# Patient Record
(demographics unavailable — no encounter records)

---

## 2024-12-31 NOTE — PHYSICAL EXAM
[de-identified] : Left hip exam  Skin: Clean/dry and intact Inspection: No obvious deformity, no swelling. Pulses: 2+ DP/PT pulses ROM: 100 flexion. Internal rotation to 40. External rotation to 40. Painful ROM: Pain with internal rotation, +groin pain.  Tenderness: No tenderness over greater trochanter. +tenderness at gluteal insertion. No paraspinal tenderness. No axial lumbar tenderness.No sacroiliac tenderness. No ttp over the ASIS/Illiac crest.  Tender to palpation of anterior hip Strength: 4/5 hip flexion, 5/5 Gluteal strength, 5/5 hip ADD, 5/5 hip ABD, 5/5 Q/H, 5/5 TA/GS/EHL Neuro: Sensation in tact to light touch throughout, DTR normal Additional tests: Negative impingement test, Negative ILEANA [de-identified] :  The following radiographs were ordered and read by me during this patients visit. I reviewed each radiograph in detail with the patient and discussed the findings as highlighted below.   3 views of the left hip were obtained today that show no acute fracture or dislocation. There is mild degenerative change seen. There is no gross malalignment.  Evidence of CAM lesion / NITIN.

## 2024-12-31 NOTE — DISCUSSION/SUMMARY
[de-identified] : 57-year-old female with left hip pain  Patient presents for evaluation of left hip pain.  Symptoms appear to be related to the anterior hip consistent with local hip flexor strain.  Patient also has some radiating symptoms through the lateral aspect of the hip consistent with local bursitis or iliotibial band irritation.  Symptoms particularly symptomatic with activity.  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   Recommendation: Activity modification, PT evaluation.  Symptom management with ice/NSAIDs as needed.  Follow-up as needed

## 2024-12-31 NOTE — DISCUSSION/SUMMARY
[de-identified] : 57-year-old female with left hip pain  Patient presents for evaluation of left hip pain.  Symptoms appear to be related to the anterior hip consistent with local hip flexor strain.  Patient also has some radiating symptoms through the lateral aspect of the hip consistent with local bursitis or iliotibial band irritation.  Symptoms particularly symptomatic with activity.  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   Recommendation: Activity modification, PT evaluation.  Symptom management with ice/NSAIDs as needed.  Follow-up as needed

## 2024-12-31 NOTE — PHYSICAL EXAM
[de-identified] : Left hip exam  Skin: Clean/dry and intact Inspection: No obvious deformity, no swelling. Pulses: 2+ DP/PT pulses ROM: 100 flexion. Internal rotation to 40. External rotation to 40. Painful ROM: Pain with internal rotation, +groin pain.  Tenderness: No tenderness over greater trochanter. +tenderness at gluteal insertion. No paraspinal tenderness. No axial lumbar tenderness.No sacroiliac tenderness. No ttp over the ASIS/Illiac crest.  Tender to palpation of anterior hip Strength: 4/5 hip flexion, 5/5 Gluteal strength, 5/5 hip ADD, 5/5 hip ABD, 5/5 Q/H, 5/5 TA/GS/EHL Neuro: Sensation in tact to light touch throughout, DTR normal Additional tests: Negative impingement test, Negative ILEANA [de-identified] :  The following radiographs were ordered and read by me during this patients visit. I reviewed each radiograph in detail with the patient and discussed the findings as highlighted below.   3 views of the left hip were obtained today that show no acute fracture or dislocation. There is mild degenerative change seen. There is no gross malalignment.  Evidence of CAM lesion / NITIN.

## 2025-01-09 NOTE — HISTORY OF PRESENT ILLNESS
[de-identified] : Ms. ABUNDIO ECHEVARRIA is a 57 year old female with Hx of anxiety, who presents for an annual physical.   Pt is following with ortho for left hip pain. She started PT this week. She c/o body aches and fatigue. Was in [place] and there were ticks. Would like to be checked for Lyme. Denies any SOB, CP, abdominal pain, N/V/D, headache, dizziness, or leg swelling.

## 2025-01-09 NOTE — ADDENDUM
[FreeTextEntry1] : Documented by Clair Greenwood acting as a scribe for Dr. Rajwinder Tadeo. 01/09/2025   All medical record entries made by the scribe were at my, Dr. Rajwinder Tadeo, direction and personally dictated by me on 01/09/2025. I have reviewed the chart and agree that the record accurately reflects my personal performance of the history, physical exam, assessment and plan. I have also personally directed, reviewed, and agreed with the chart.

## 2025-01-09 NOTE — HEALTH RISK ASSESSMENT
[Fair] :  ~his/her~ mood as fair [No] : No [Little interest or pleasure doing things] : 1) Little interest or pleasure doing things [0] : 1) Little interest or pleasure doing things: Not at all (0) [Feeling down, depressed, or hopeless] : 2) Feeling down, depressed, or hopeless [1] : 2) Feeling down, depressed, or hopeless for several days (1) [PHQ-2 Negative - No further assessment needed] : PHQ-2 Negative - No further assessment needed [Never] : Never [Patient reported mammogram was normal] : Patient reported mammogram was normal [Patient reported PAP Smear was normal] : Patient reported PAP Smear was normal [Patient reported colonoscopy was abnormal] : Patient reported colonoscopy was abnormal [With Family] : lives with family [# of Members in Household ___] :  household currently consist of [unfilled] member(s) [Employed] : employed [College] : College [] :  [# Of Children ___] : has [unfilled] children [Feels Safe at Home] : Feels safe at home [de-identified] : Maintains active by walking and biking. [de-identified] : Maintains a healthy diet.  [RPA4Dittx] : 0 [MammogramDate] : 04/2024 [PapSmearDate] : 01/2024 [ColonoscopyDate] : 05/2019 [ColonoscopyComments] : Internal hemorrhoids, one polyp removed. Was told to return in ten years.

## 2025-01-09 NOTE — PHYSICAL EXAM
[No Acute Distress] : no acute distress [Well Nourished] : well nourished [Well Developed] : well developed [Well-Appearing] : well-appearing [Normal Sclera/Conjunctiva] : normal sclera/conjunctiva [PERRL] : pupils equal round and reactive to light [EOMI] : extraocular movements intact [Normal Outer Ear/Nose] : the outer ears and nose were normal in appearance [Normal Oropharynx] : the oropharynx was normal [Normal TMs] : both tympanic membranes were normal [No JVD] : no jugular venous distention [No Lymphadenopathy] : no lymphadenopathy [Supple] : supple [Thyroid Normal, No Nodules] : the thyroid was normal and there were no nodules present [No Respiratory Distress] : no respiratory distress  [No Accessory Muscle Use] : no accessory muscle use [Clear to Auscultation] : lungs were clear to auscultation bilaterally [Normal Rate] : normal rate  [Regular Rhythm] : with a regular rhythm [Normal S1, S2] : normal S1 and S2 [No Murmur] : no murmur heard [Pedal Pulses Present] : the pedal pulses are present [No Edema] : there was no peripheral edema [No Extremity Clubbing/Cyanosis] : no extremity clubbing/cyanosis [Soft] : abdomen soft [Non Tender] : non-tender [Non-distended] : non-distended [No Masses] : no abdominal mass palpated [No HSM] : no HSM [Normal Bowel Sounds] : normal bowel sounds [Normal Posterior Cervical Nodes] : no posterior cervical lymphadenopathy [Normal Anterior Cervical Nodes] : no anterior cervical lymphadenopathy [No CVA Tenderness] : no CVA  tenderness [No Spinal Tenderness] : no spinal tenderness [No Joint Swelling] : no joint swelling [Grossly Normal Strength/Tone] : grossly normal strength/tone [No Rash] : no rash [Coordination Grossly Intact] : coordination grossly intact [No Focal Deficits] : no focal deficits [Normal Gait] : normal gait [Normal Affect] : the affect was normal [Normal Insight/Judgement] : insight and judgment were intact [de-identified] : + left hip pain with ROM

## 2025-01-09 NOTE — REVIEW OF SYSTEMS
[Fatigue] : fatigue [Joint Pain] : joint pain [Negative] : Heme/Lymph [FreeTextEntry9] : left hip pain

## 2025-01-09 NOTE — ASSESSMENT
[FreeTextEntry1] : Physical  UTD with colonoscopy and mammogram pt to schedule an sonny't with her GYN for PAP  Left hip pain following with ortho start PT  Body aches, fatigue we'll check lyme panel, arthralgia panel, TSH/T4 today  Anxiety cont Lexapro 5mg daily  Blood work ordered. Follow up in one week for lab results

## 2025-01-24 NOTE — ADDENDUM
[FreeTextEntry1] : Documented by Clair Greenwood acting as a scribe for Dr. Rajwinder Tadeo. 01/21/2025   All medical record entries made by the scribe were at my, Dr. Rajwinder Tadeo, direction and personally dictated by me on 01/21/2025. I have reviewed the chart and agree that the record accurately reflects my personal performance of the history, physical exam, assessment and plan. I have also personally directed, reviewed, and agreed with the chart.

## 2025-01-24 NOTE — HISTORY OF PRESENT ILLNESS
[de-identified] : Ms. ABUNDIO ECHEVARRIA is a 57 year old female with Hx of anxiety, who presents for follow up on lab results.   Pt c/o generalized joint pains.  Denies any SOB, CP, abdominal pain, N/V/D, headache, dizziness, or leg swelling.

## 2025-01-24 NOTE — HISTORY OF PRESENT ILLNESS
[de-identified] : Ms. ABUNDIO ECHEVARRIA is a 57 year old female with Hx of anxiety, who presents for follow up on lab results.   Pt c/o generalized joint pains.  Denies any SOB, CP, abdominal pain, N/V/D, headache, dizziness, or leg swelling.

## 2025-01-24 NOTE — ASSESSMENT
[FreeTextEntry1] : Follow up  borderline SBP today  Discussed the importance of following a low sodium diet  joint pains OTC Advil/Motrin prn follow up with rheumatology  left hip pain following with ortho start PT  Anxiety cont Lexapro 5mg daily  lab results reviewed and discussed with patient  microscopic WBC in urine, UA otherwise normal  slightly elevated cholesterol and LDL reinforced the importance of following a low cholesterol/low fat diet   sed rate slightly elevated, all other autoimmune markers negative lyme IGM negative, lyme IGG positive referred to ID- at pt's request referred to rheumatology

## 2025-04-01 NOTE — PHYSICAL EXAM
[de-identified] : Left hip exam  Skin: Clean/dry and intact Inspection: No obvious deformity, no swelling. Pulses: 2+ DP/PT pulses ROM: 100 flexion. Internal rotation to 40. External rotation to 40. Painful ROM: Pain with internal rotation, +groin pain.  Tenderness: No tenderness over greater trochanter. +tenderness at gluteal insertion. No paraspinal tenderness. No axial lumbar tenderness.No sacroiliac tenderness. No ttp over the ASIS/[place]iac crest.  Tender to palpation of anterior hip Strength: 4/5 hip flexion, 5/5 Gluteal strength, 5/5 hip ADD, 5/5 hip ABD, 5/5 Q/H, 5/5 TA/GS/EHL Neuro: Sensation in tact to light touch throughout, DTR normal Additional tests: Negative impingement test, Negative ILEANA [de-identified] : The following radiographs were ordered and read by me during this patients visit. I reviewed each radiograph in detail with the patient and discussed the findings as highlighted below.   3 views of the left hip were obtained today that show no acute fracture or dislocation. There is mild degenerative change seen. There is no gross malalignment.  Evidence of CAM lesion / NITIN.

## 2025-04-01 NOTE — HISTORY OF PRESENT ILLNESS
[de-identified] : 57-year-old female who presents for follow-up of left hip pain x 6 months. The pain is intermittent and without any specific cause or trauma.  Patient has been attending PT 2x per week without relief.  Her pain is worse if she walks or does any increased activity.  She reports ibuprofen provide some relief.  She feels limited since she enjoys hiking.  Patient also has difficulty with ADLs and work with stairs.

## 2025-04-01 NOTE — HISTORY OF PRESENT ILLNESS
[de-identified] : 57-year-old female who presents for follow-up of left hip pain x 6 months. The pain is intermittent and without any specific cause or trauma.  Patient has been attending PT 2x per week without relief.  Her pain is worse if she walks or does any increased activity.  She reports ibuprofen provide some relief.  She feels limited since she enjoys hiking.  Patient also has difficulty with ADLs and work with stairs.

## 2025-04-01 NOTE — PHYSICAL EXAM
[de-identified] : Left hip exam  Skin: Clean/dry and intact Inspection: No obvious deformity, no swelling. Pulses: 2+ DP/PT pulses ROM: 100 flexion. Internal rotation to 40. External rotation to 40. Painful ROM: Pain with internal rotation, +groin pain.  Tenderness: No tenderness over greater trochanter. +tenderness at gluteal insertion. No paraspinal tenderness. No axial lumbar tenderness.No sacroiliac tenderness. No ttp over the ASIS/[place]iac crest.  Tender to palpation of anterior hip Strength: 4/5 hip flexion, 5/5 Gluteal strength, 5/5 hip ADD, 5/5 hip ABD, 5/5 Q/H, 5/5 TA/GS/EHL Neuro: Sensation in tact to light touch throughout, DTR normal Additional tests: Negative impingement test, Negative ILEANA [de-identified] : The following radiographs were ordered and read by me during this patients visit. I reviewed each radiograph in detail with the patient and discussed the findings as highlighted below.   3 views of the left hip were obtained today that show no acute fracture or dislocation. There is mild degenerative change seen. There is no gross malalignment.  Evidence of CAM lesion / NITIN.

## 2025-04-01 NOTE — DISCUSSION/SUMMARY
[de-identified] : 57-year-old female with left hip pain  Patient presents for reevaluation of left hip pain.  Radiographic imaging suggested early arthrosis with evidence of chronic NITIN at the last visit, and pain related through the anterior hip consistent with hip flexor irritation as well as degenerative joint disease.  We discussed age-related "wear and tear" from chronic impingement within the hip with pain related to breakdown of the chondral surfaces, cartilage, or ligaments of the hip.   We discussed continued treatments for chronic condition.  Would recommend continued treatment for arthrosis within the left hip.  Consider injection therapy to the left hip for symptomatic relief of symptoms.  Dependent on residual symptoms, patient may ultimately consider referral to hip arthroplasty for potential invasive treatment.  Briefly discussed role of hip arthroplasty, however radiographic evidence of OA appears minor.  Discussed symptoms likely related to activity level, we discussed utility of activity modification.  Recommendation: Continue PT Updated Rx given.  Ultrasound cortisone injection left hip joint.  Follow-up Ortho arthroplasty if no improvement with injection therapy.

## 2025-04-01 NOTE — DISCUSSION/SUMMARY
[de-identified] : 57-year-old female with left hip pain  Patient presents for reevaluation of left hip pain.  Radiographic imaging suggested early arthrosis with evidence of chronic NITIN at the last visit, and pain related through the anterior hip consistent with hip flexor irritation as well as degenerative joint disease.  We discussed age-related "wear and tear" from chronic impingement within the hip with pain related to breakdown of the chondral surfaces, cartilage, or ligaments of the hip.   We discussed continued treatments for chronic condition.  Would recommend continued treatment for arthrosis within the left hip.  Consider injection therapy to the left hip for symptomatic relief of symptoms.  Dependent on residual symptoms, patient may ultimately consider referral to hip arthroplasty for potential invasive treatment.  Briefly discussed role of hip arthroplasty, however radiographic evidence of OA appears minor.  Discussed symptoms likely related to activity level, we discussed utility of activity modification.  Recommendation: Continue PT Updated Rx given.  Ultrasound cortisone injection left hip joint.  Follow-up Ortho arthroplasty if no improvement with injection therapy.

## 2025-04-01 NOTE — ADDENDUM
[FreeTextEntry1] : This note was written by Martha Hidalgo on 03/31/2025 acting solely as a scribe for Dr. Daniel Loza.   All medical record entries made by the Scribe were at my, Dr. Daniel Loza, direction and personally dictated by me on 03/31/2025. I have personally reviewed the chart and agree that the record accurately reflects my personal performance of the history, physical exam, assessment and plan.